# Patient Record
Sex: FEMALE | Race: WHITE
[De-identification: names, ages, dates, MRNs, and addresses within clinical notes are randomized per-mention and may not be internally consistent; named-entity substitution may affect disease eponyms.]

---

## 2021-11-03 PROBLEM — Z00.00 ENCOUNTER FOR PREVENTIVE HEALTH EXAMINATION: Status: ACTIVE | Noted: 2021-11-03

## 2021-11-08 ENCOUNTER — APPOINTMENT (OUTPATIENT)
Dept: PEDIATRIC ALLERGY IMMUNOLOGY | Facility: CLINIC | Age: 41
End: 2021-11-08
Payer: COMMERCIAL

## 2021-11-08 VITALS
BODY MASS INDEX: 25.83 KG/M2 | HEIGHT: 65 IN | WEIGHT: 155 LBS | SYSTOLIC BLOOD PRESSURE: 108 MMHG | DIASTOLIC BLOOD PRESSURE: 62 MMHG

## 2021-11-08 PROCEDURE — 99203 OFFICE O/P NEW LOW 30 MIN: CPT | Mod: 25

## 2021-11-08 PROCEDURE — 95004 PERQ TESTS W/ALRGNC XTRCS: CPT

## 2021-11-08 RX ORDER — EPINEPHRINE 0.3 MG/.3ML
0.3 INJECTION INTRAMUSCULAR
Qty: 1 | Refills: 1 | Status: ACTIVE | COMMUNITY
Start: 2021-11-08 | End: 1900-01-01

## 2021-11-08 RX ORDER — FLUTICASONE PROPIONATE 50 UG/1
50 SPRAY, METERED NASAL DAILY
Qty: 1 | Refills: 3 | Status: ACTIVE | COMMUNITY
Start: 2021-11-08 | End: 1900-01-01

## 2021-11-08 NOTE — HISTORY OF PRESENT ILLNESS
[de-identified] : TINA CASALE is a 40 year female  with a history of anaphylaxis reaction to unknown source about 10 years ago (she was on ICU for 10 days). Pt states she never found out that caused of reaction, Dr thinks it was due to a medication (clonazepam). She had an allergy test done in  and it came back positives to multiple indoor and outdoor allergens. No food allergies that she is aware of. Pt takes Claritin as needed but states for the past few months it hasn’t been working, she gets sore throat, watery and itchy eyes, itchy skin and swollen eye lids.Pt is here to get an evaluation on the severity of her allergies. \par \par She has had increasing complaints of itchy throat and eyes. She has an  epipen. Drug allergy to clonazepam She does eat cheese and shellfish.but not lobster.

## 2021-11-08 NOTE — REVIEW OF SYSTEMS
[Swollen Eyelids] : ~T ~L swollen eyelids [Nasal Congestion] : nasal congestion [Sore Throat] : sore throat [Throat Itching] : throat itching [Sneezing] : sneezing [Difficulty Breathing] : dyspnea [Headache] : headache [Depression] : depression [Nl] : Genitourinary

## 2021-11-08 NOTE — ASSESSMENT
[FreeTextEntry1] : 1. AR/AC - loratadine 10mg + Fluticasone nasal  \par \par 2. Drug allergy - clonazapam - avoid and epipen\par

## 2021-11-08 NOTE — SOCIAL HISTORY
[House] : [unfilled] lives in a house  [Radiator/Baseboard] : heating provided by radiator(s)/baseboard(s) [Window Units] : air conditioning provided by window units [Damp/Musty] : damp/musty [Dog] : dog [] :  [Humidifier] : does not use a humidifier [Dehumidifier] : does not use a dehumidifier [Cockroaches] : Patient states that there are no cockroaches in the home [Dust Mite Covers] : does not have dust mite covers [Feather Pillows] : does not have feather pillows [Feather Comforter] : does not have a feather comforter [Bedroom] : not in the bedroom [Basement] : not in the basement [Living Area] : not in the living area [Smokers in Household] : there are no smokers in the home

## 2022-07-14 ENCOUNTER — APPOINTMENT (OUTPATIENT)
Dept: PEDIATRIC ALLERGY IMMUNOLOGY | Facility: CLINIC | Age: 42
End: 2022-07-14

## 2022-07-14 VITALS — BODY MASS INDEX: 25.83 KG/M2 | HEIGHT: 65 IN | WEIGHT: 155 LBS

## 2022-07-14 PROCEDURE — 99214 OFFICE O/P EST MOD 30 MIN: CPT

## 2022-07-14 NOTE — ASSESSMENT
[FreeTextEntry1] : 1. AR/AC - loratadine 10mg + Fluticasone nasal  \par \par 2. Drug allergy - clonazapam - avoid and epipen\par \par 3. ?Anaphylaxis vs angioedema - ? New powder supplement? - epipen

## 2022-07-14 NOTE — REVIEW OF SYSTEMS
[Difficulty Swallowing] : dysphagia [Urticaria] : urticaria [Pruritus] : pruritus [Swelling] : swelling [Nl] : Genitourinary [FreeTextEntry4] : swollen throat

## 2022-07-14 NOTE — HISTORY OF PRESENT ILLNESS
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 Patient states symptoms started at  2:30 pm on Tuesday, she developed hives on chin by 9:00 pm lips and left side got swollen. At 3:00 am patient states symptoms got worse (throat felt swollen and difficulty swallowing). She went to the ER where Epinephrine was administered as well as steroids and Benadryl was given. She was monitored for several hours and then discharge with medications prescribed, prednisone, Famotidine 20 mg, and Benadryl. Patient states she tried a new energy powder that morning. No other changes on her diet. \par \par She felt more burning rather than itching.

## 2022-07-25 ENCOUNTER — APPOINTMENT (OUTPATIENT)
Dept: PEDIATRIC ALLERGY IMMUNOLOGY | Facility: CLINIC | Age: 42
End: 2022-07-25

## 2022-08-02 ENCOUNTER — APPOINTMENT (OUTPATIENT)
Dept: PEDIATRIC ALLERGY IMMUNOLOGY | Facility: CLINIC | Age: 42
End: 2022-08-02

## 2022-08-02 VITALS — BODY MASS INDEX: 25.83 KG/M2 | WEIGHT: 155 LBS | HEIGHT: 65 IN

## 2022-08-02 DIAGNOSIS — J30.89 OTHER ALLERGIC RHINITIS: ICD-10-CM

## 2022-08-02 DIAGNOSIS — H10.10 ACUTE ATOPIC CONJUNCTIVITIS, UNSPECIFIED EYE: ICD-10-CM

## 2022-08-02 DIAGNOSIS — Z88.9 ALLERGY STATUS TO UNSPECIFIED DRUGS, MEDICAMENTS AND BIOLOGICAL SUBSTANCES: ICD-10-CM

## 2022-08-02 DIAGNOSIS — T78.00XA ANAPHYLACTIC REACTION DUE TO UNSPECIFIED FOOD, INITIAL ENCOUNTER: ICD-10-CM

## 2022-08-02 DIAGNOSIS — T78.3XXA ANGIONEUROTIC EDEMA, INITIAL ENCOUNTER: ICD-10-CM

## 2022-08-02 PROCEDURE — 99213 OFFICE O/P EST LOW 20 MIN: CPT

## 2022-08-02 NOTE — HISTORY OF PRESENT ILLNESS
[None] : The patient is currently asymptomatic [de-identified] : TINA CASALE is a 41 year female  with a history of facial swelling, and bumps on forehead. Patient is here for a follow up on blood work results. Patient states she had an anaphylactic reaction on Friday July 15. Patient states she woke up Friday morning with facial swelling, bumps on forehead and she felt her throat swollen. Patient states she went to ER where she was admitted and she was given steroids, 3 epinephrines were administered and Benadryl. She got discharge on Saturday night. No episodes since then.  She felt heat and swelling  on her face. She didn't feel itching and and she didn't feel much other than swelling. She was given 3 shots of epinephine and the swelling didn't resolve for hours.  She was admitted for 1 night and swelling went back to normal. She continued to take prednisone and benedryl.  She says it was when her period started, which was out of normal cycle.

## 2022-08-02 NOTE — ASSESSMENT
[FreeTextEntry1] : 1. AR/AC - loratadine 10mg + Fluticasone nasal  \par \par 2. Drug allergy - clonazapam - avoid and epipen\par \par 3. Angioedema - epipen  - to see [facility] and endocrine follow up -montelukast 10mg

## 2022-10-07 ENCOUNTER — RX RENEWAL (OUTPATIENT)
Age: 42
End: 2022-10-07

## 2022-10-07 RX ORDER — MONTELUKAST 10 MG/1
10 TABLET, FILM COATED ORAL
Qty: 30 | Refills: 0 | Status: ACTIVE | COMMUNITY
Start: 2022-08-02 | End: 1900-01-01

## 2025-02-12 ENCOUNTER — APPOINTMENT (OUTPATIENT)
Dept: INTERNAL MEDICINE | Facility: CLINIC | Age: 45
End: 2025-02-12
Payer: MEDICAID

## 2025-02-12 ENCOUNTER — NON-APPOINTMENT (OUTPATIENT)
Age: 45
End: 2025-02-12

## 2025-02-12 VITALS
DIASTOLIC BLOOD PRESSURE: 68 MMHG | HEIGHT: 65 IN | WEIGHT: 145 LBS | SYSTOLIC BLOOD PRESSURE: 117 MMHG | TEMPERATURE: 98.2 F | BODY MASS INDEX: 24.16 KG/M2 | OXYGEN SATURATION: 97 % | HEART RATE: 76 BPM

## 2025-02-12 DIAGNOSIS — Z80.0 FAMILY HISTORY OF MALIGNANT NEOPLASM OF DIGESTIVE ORGANS: ICD-10-CM

## 2025-02-12 DIAGNOSIS — Z82.0 FAMILY HISTORY OF EPILEPSY AND OTHER DISEASES OF THE NERVOUS SYSTEM: ICD-10-CM

## 2025-02-12 DIAGNOSIS — Z13.228 ENCOUNTER FOR SCREENING FOR OTHER METABOLIC DISORDERS: ICD-10-CM

## 2025-02-12 DIAGNOSIS — Z12.11 ENCOUNTER FOR SCREENING FOR MALIGNANT NEOPLASM OF COLON: ICD-10-CM

## 2025-02-12 DIAGNOSIS — Z13.6 ENCOUNTER FOR SCREENING FOR CARDIOVASCULAR DISORDERS: ICD-10-CM

## 2025-02-12 DIAGNOSIS — Z81.8 FAMILY HISTORY OF OTHER MENTAL AND BEHAVIORAL DISORDERS: ICD-10-CM

## 2025-02-12 DIAGNOSIS — Z00.00 ENCOUNTER FOR GENERAL ADULT MEDICAL EXAMINATION W/OUT ABNORMAL FINDINGS: ICD-10-CM

## 2025-02-12 PROCEDURE — 93000 ELECTROCARDIOGRAM COMPLETE: CPT

## 2025-02-12 PROCEDURE — 99386 PREV VISIT NEW AGE 40-64: CPT | Mod: 25

## 2025-02-12 RX ORDER — DESVENLAFAXINE 50 MG/1
50 TABLET, EXTENDED RELEASE ORAL
Refills: 0 | Status: ACTIVE | COMMUNITY